# Patient Record
Sex: FEMALE | Race: WHITE | NOT HISPANIC OR LATINO | URBAN - METROPOLITAN AREA
[De-identification: names, ages, dates, MRNs, and addresses within clinical notes are randomized per-mention and may not be internally consistent; named-entity substitution may affect disease eponyms.]

---

## 2017-02-02 ENCOUNTER — FOLLOW UP ESTABLISHED (OUTPATIENT)
Dept: URBAN - METROPOLITAN AREA CLINIC 24 | Facility: CLINIC | Age: 82
End: 2017-02-02
Payer: MEDICARE

## 2017-02-02 PROCEDURE — 92134 CPTRZ OPH DX IMG PST SGM RTA: CPT | Performed by: OPHTHALMOLOGY

## 2017-02-02 PROCEDURE — 92014 COMPRE OPH EXAM EST PT 1/>: CPT | Performed by: OPHTHALMOLOGY

## 2017-02-02 PROCEDURE — 92235 FLUORESCEIN ANGRPH MLTIFRAME: CPT | Performed by: OPHTHALMOLOGY

## 2017-02-02 PROCEDURE — 67028 INJECTION EYE DRUG: CPT | Performed by: OPHTHALMOLOGY

## 2017-02-02 ASSESSMENT — INTRAOCULAR PRESSURE
OS: 17
OD: 16

## 2017-04-06 ENCOUNTER — FOLLOW UP ESTABLISHED (OUTPATIENT)
Dept: URBAN - METROPOLITAN AREA CLINIC 24 | Facility: CLINIC | Age: 82
End: 2017-04-06
Payer: MEDICARE

## 2017-04-06 PROCEDURE — 92014 COMPRE OPH EXAM EST PT 1/>: CPT | Performed by: OPHTHALMOLOGY

## 2017-04-06 PROCEDURE — 92134 CPTRZ OPH DX IMG PST SGM RTA: CPT | Performed by: OPHTHALMOLOGY

## 2017-04-06 PROCEDURE — 67028 INJECTION EYE DRUG: CPT | Performed by: OPHTHALMOLOGY

## 2017-04-06 ASSESSMENT — INTRAOCULAR PRESSURE
OD: 18
OS: 18

## 2017-07-06 ENCOUNTER — FOLLOW UP ESTABLISHED (OUTPATIENT)
Dept: URBAN - METROPOLITAN AREA CLINIC 24 | Facility: CLINIC | Age: 82
End: 2017-07-06
Payer: MEDICARE

## 2017-07-06 PROCEDURE — 67028 INJECTION EYE DRUG: CPT | Performed by: OPHTHALMOLOGY

## 2017-07-06 PROCEDURE — 92134 CPTRZ OPH DX IMG PST SGM RTA: CPT | Performed by: OPHTHALMOLOGY

## 2017-07-06 PROCEDURE — 92014 COMPRE OPH EXAM EST PT 1/>: CPT | Performed by: OPHTHALMOLOGY

## 2017-07-06 ASSESSMENT — INTRAOCULAR PRESSURE
OD: 16
OS: 17

## 2017-10-19 ENCOUNTER — FOLLOW UP ESTABLISHED (OUTPATIENT)
Dept: URBAN - METROPOLITAN AREA CLINIC 24 | Facility: CLINIC | Age: 82
End: 2017-10-19
Payer: MEDICARE

## 2017-10-19 DIAGNOSIS — H43.813 VITREOUS DEGENERATION, BILATERAL: ICD-10-CM

## 2017-10-19 PROCEDURE — 92134 CPTRZ OPH DX IMG PST SGM RTA: CPT | Performed by: OPTOMETRIST

## 2017-10-19 PROCEDURE — 99214 OFFICE O/P EST MOD 30 MIN: CPT | Performed by: OPTOMETRIST

## 2017-10-19 ASSESSMENT — INTRAOCULAR PRESSURE
OS: 22
OD: 22

## 2017-11-09 ENCOUNTER — FOLLOW UP ESTABLISHED (OUTPATIENT)
Dept: URBAN - METROPOLITAN AREA CLINIC 24 | Facility: CLINIC | Age: 82
End: 2017-11-09
Payer: MEDICARE

## 2017-11-09 DIAGNOSIS — H25.13 AGE-RELATED NUCLEAR CATARACT, BILATERAL: ICD-10-CM

## 2017-11-09 PROCEDURE — 92014 COMPRE OPH EXAM EST PT 1/>: CPT | Performed by: OPHTHALMOLOGY

## 2017-11-09 PROCEDURE — 92134 CPTRZ OPH DX IMG PST SGM RTA: CPT | Performed by: OPHTHALMOLOGY

## 2017-11-09 ASSESSMENT — INTRAOCULAR PRESSURE
OS: 15
OD: 15

## 2018-04-26 ENCOUNTER — FOLLOW UP ESTABLISHED (OUTPATIENT)
Dept: URBAN - METROPOLITAN AREA CLINIC 24 | Facility: CLINIC | Age: 83
End: 2018-04-26
Payer: MEDICARE

## 2018-04-26 PROCEDURE — 92134 CPTRZ OPH DX IMG PST SGM RTA: CPT | Performed by: OPHTHALMOLOGY

## 2018-04-26 PROCEDURE — 92014 COMPRE OPH EXAM EST PT 1/>: CPT | Performed by: OPHTHALMOLOGY

## 2018-04-26 PROCEDURE — 67028 INJECTION EYE DRUG: CPT | Performed by: OPHTHALMOLOGY

## 2018-04-26 ASSESSMENT — INTRAOCULAR PRESSURE
OD: 19
OS: 14

## 2018-06-05 ENCOUNTER — FOLLOW UP ESTABLISHED (OUTPATIENT)
Dept: URBAN - METROPOLITAN AREA CLINIC 24 | Facility: CLINIC | Age: 83
End: 2018-06-05
Payer: MEDICARE

## 2018-06-05 PROCEDURE — 67028 INJECTION EYE DRUG: CPT | Performed by: OPHTHALMOLOGY

## 2018-06-05 ASSESSMENT — INTRAOCULAR PRESSURE
OS: 13
OD: 19

## 2018-07-05 ENCOUNTER — FOLLOW UP ESTABLISHED (OUTPATIENT)
Dept: URBAN - METROPOLITAN AREA CLINIC 24 | Facility: CLINIC | Age: 83
End: 2018-07-05
Payer: MEDICARE

## 2018-07-05 PROCEDURE — 67028 INJECTION EYE DRUG: CPT | Performed by: OPHTHALMOLOGY

## 2018-07-05 ASSESSMENT — INTRAOCULAR PRESSURE
OS: 17
OD: 13

## 2018-08-23 ENCOUNTER — FOLLOW UP ESTABLISHED (OUTPATIENT)
Dept: URBAN - METROPOLITAN AREA CLINIC 24 | Facility: CLINIC | Age: 83
End: 2018-08-23
Payer: MEDICARE

## 2018-08-23 PROCEDURE — 67028 INJECTION EYE DRUG: CPT | Performed by: OPHTHALMOLOGY

## 2018-08-23 PROCEDURE — 92134 CPTRZ OPH DX IMG PST SGM RTA: CPT | Performed by: OPHTHALMOLOGY

## 2018-08-23 PROCEDURE — 92014 COMPRE OPH EXAM EST PT 1/>: CPT | Performed by: OPHTHALMOLOGY

## 2018-08-23 ASSESSMENT — INTRAOCULAR PRESSURE
OD: 16
OS: 15

## 2018-10-04 ENCOUNTER — FOLLOW UP ESTABLISHED (OUTPATIENT)
Dept: URBAN - METROPOLITAN AREA CLINIC 24 | Facility: CLINIC | Age: 83
End: 2018-10-04
Payer: MEDICARE

## 2018-10-04 PROCEDURE — 67028 INJECTION EYE DRUG: CPT | Performed by: OPHTHALMOLOGY

## 2018-10-04 ASSESSMENT — INTRAOCULAR PRESSURE
OD: 14
OS: 12

## 2018-11-15 ENCOUNTER — FOLLOW UP ESTABLISHED (OUTPATIENT)
Dept: URBAN - METROPOLITAN AREA CLINIC 24 | Facility: CLINIC | Age: 83
End: 2018-11-15
Payer: MEDICARE

## 2018-11-15 PROCEDURE — 67028 INJECTION EYE DRUG: CPT | Performed by: OPHTHALMOLOGY

## 2018-11-15 ASSESSMENT — INTRAOCULAR PRESSURE
OD: 18
OS: 15

## 2018-12-11 ENCOUNTER — FOLLOW UP ESTABLISHED (OUTPATIENT)
Dept: URBAN - METROPOLITAN AREA CLINIC 24 | Facility: CLINIC | Age: 83
End: 2018-12-11
Payer: MEDICARE

## 2018-12-11 PROCEDURE — 67028 INJECTION EYE DRUG: CPT | Performed by: OPHTHALMOLOGY

## 2018-12-11 PROCEDURE — 92134 CPTRZ OPH DX IMG PST SGM RTA: CPT | Performed by: OPHTHALMOLOGY

## 2018-12-11 ASSESSMENT — INTRAOCULAR PRESSURE
OS: 14
OD: 15

## 2019-01-17 ENCOUNTER — FOLLOW UP ESTABLISHED (OUTPATIENT)
Dept: URBAN - METROPOLITAN AREA CLINIC 24 | Facility: CLINIC | Age: 84
End: 2019-01-17
Payer: MEDICARE

## 2019-01-17 PROCEDURE — 67028 INJECTION EYE DRUG: CPT | Performed by: OPHTHALMOLOGY

## 2019-01-17 ASSESSMENT — INTRAOCULAR PRESSURE
OS: 17
OD: 19

## 2019-02-14 ENCOUNTER — FOLLOW UP ESTABLISHED (OUTPATIENT)
Dept: URBAN - METROPOLITAN AREA CLINIC 24 | Facility: CLINIC | Age: 84
End: 2019-02-14
Payer: MEDICARE

## 2019-02-14 PROCEDURE — 67028 INJECTION EYE DRUG: CPT | Performed by: OPHTHALMOLOGY

## 2019-02-14 ASSESSMENT — INTRAOCULAR PRESSURE
OD: 24
OS: 27

## 2019-03-14 ENCOUNTER — FOLLOW UP ESTABLISHED (OUTPATIENT)
Dept: URBAN - METROPOLITAN AREA CLINIC 24 | Facility: CLINIC | Age: 84
End: 2019-03-14
Payer: MEDICARE

## 2019-03-14 PROCEDURE — 67028 INJECTION EYE DRUG: CPT | Performed by: OPHTHALMOLOGY

## 2019-03-14 PROCEDURE — 92014 COMPRE OPH EXAM EST PT 1/>: CPT | Performed by: OPHTHALMOLOGY

## 2019-03-14 PROCEDURE — 92134 CPTRZ OPH DX IMG PST SGM RTA: CPT | Performed by: OPHTHALMOLOGY

## 2019-03-14 ASSESSMENT — INTRAOCULAR PRESSURE
OS: 18
OD: 15

## 2019-04-11 ENCOUNTER — FOLLOW UP ESTABLISHED (OUTPATIENT)
Dept: URBAN - METROPOLITAN AREA CLINIC 24 | Facility: CLINIC | Age: 84
End: 2019-04-11
Payer: MEDICARE

## 2019-04-11 PROCEDURE — 67028 INJECTION EYE DRUG: CPT | Performed by: OPHTHALMOLOGY

## 2019-04-11 ASSESSMENT — INTRAOCULAR PRESSURE
OD: 16
OS: 14

## 2019-05-09 ENCOUNTER — FOLLOW UP ESTABLISHED (OUTPATIENT)
Dept: URBAN - METROPOLITAN AREA CLINIC 24 | Facility: CLINIC | Age: 84
End: 2019-05-09
Payer: MEDICARE

## 2019-05-09 PROCEDURE — 67028 INJECTION EYE DRUG: CPT | Performed by: OPHTHALMOLOGY

## 2019-05-09 ASSESSMENT — INTRAOCULAR PRESSURE
OS: 22
OD: 22

## 2019-09-26 ENCOUNTER — FOLLOW UP ESTABLISHED (OUTPATIENT)
Dept: URBAN - METROPOLITAN AREA CLINIC 24 | Facility: CLINIC | Age: 84
End: 2019-09-26
Payer: MEDICARE

## 2019-09-26 PROCEDURE — 67028 INJECTION EYE DRUG: CPT | Performed by: OPHTHALMOLOGY

## 2019-09-26 ASSESSMENT — INTRAOCULAR PRESSURE
OS: 19
OD: 18

## 2019-11-21 ENCOUNTER — FOLLOW UP ESTABLISHED (OUTPATIENT)
Dept: URBAN - METROPOLITAN AREA CLINIC 24 | Facility: CLINIC | Age: 84
End: 2019-11-21
Payer: MEDICARE

## 2019-11-21 PROCEDURE — 92014 COMPRE OPH EXAM EST PT 1/>: CPT | Performed by: OPHTHALMOLOGY

## 2019-11-21 PROCEDURE — 92134 CPTRZ OPH DX IMG PST SGM RTA: CPT | Performed by: OPHTHALMOLOGY

## 2019-11-21 PROCEDURE — 67028 INJECTION EYE DRUG: CPT | Performed by: OPHTHALMOLOGY

## 2019-11-21 ASSESSMENT — INTRAOCULAR PRESSURE
OD: 18
OS: 17

## 2020-01-16 ENCOUNTER — FOLLOW UP ESTABLISHED (OUTPATIENT)
Dept: URBAN - METROPOLITAN AREA CLINIC 24 | Facility: CLINIC | Age: 85
End: 2020-01-16
Payer: MEDICARE

## 2020-01-16 PROCEDURE — 67028 INJECTION EYE DRUG: CPT | Performed by: OPHTHALMOLOGY

## 2020-01-16 ASSESSMENT — INTRAOCULAR PRESSURE
OD: 12
OS: 12

## 2020-03-05 ENCOUNTER — FOLLOW UP ESTABLISHED (OUTPATIENT)
Dept: URBAN - METROPOLITAN AREA CLINIC 24 | Facility: CLINIC | Age: 85
End: 2020-03-05
Payer: MEDICARE

## 2020-03-05 PROCEDURE — 67028 INJECTION EYE DRUG: CPT | Performed by: OPHTHALMOLOGY

## 2020-03-05 ASSESSMENT — INTRAOCULAR PRESSURE
OS: 20
OD: 26

## 2020-04-09 ENCOUNTER — FOLLOW UP ESTABLISHED (OUTPATIENT)
Dept: URBAN - METROPOLITAN AREA CLINIC 24 | Facility: CLINIC | Age: 85
End: 2020-04-09
Payer: MEDICARE

## 2020-04-09 PROCEDURE — 67028 INJECTION EYE DRUG: CPT | Performed by: OPHTHALMOLOGY

## 2020-04-09 ASSESSMENT — INTRAOCULAR PRESSURE
OD: 17
OS: 15

## 2020-06-05 ENCOUNTER — FOLLOW UP ESTABLISHED (OUTPATIENT)
Dept: URBAN - METROPOLITAN AREA CLINIC 10 | Facility: CLINIC | Age: 85
End: 2020-06-05
Payer: MEDICARE

## 2020-06-05 PROCEDURE — 92134 CPTRZ OPH DX IMG PST SGM RTA: CPT | Performed by: OPHTHALMOLOGY

## 2020-06-05 PROCEDURE — 92014 COMPRE OPH EXAM EST PT 1/>: CPT | Performed by: OPHTHALMOLOGY

## 2020-06-05 PROCEDURE — 67028 INJECTION EYE DRUG: CPT | Performed by: OPHTHALMOLOGY

## 2020-06-05 ASSESSMENT — INTRAOCULAR PRESSURE
OD: 24
OS: 20

## 2020-08-20 ENCOUNTER — FOLLOW UP ESTABLISHED (OUTPATIENT)
Dept: URBAN - METROPOLITAN AREA CLINIC 24 | Facility: CLINIC | Age: 85
End: 2020-08-20
Payer: MEDICARE

## 2020-08-20 PROCEDURE — 67028 INJECTION EYE DRUG: CPT | Performed by: OPHTHALMOLOGY

## 2020-08-20 ASSESSMENT — INTRAOCULAR PRESSURE
OD: 20
OS: 15

## 2020-10-15 ENCOUNTER — FOLLOW UP ESTABLISHED (OUTPATIENT)
Dept: URBAN - METROPOLITAN AREA CLINIC 24 | Facility: CLINIC | Age: 85
End: 2020-10-15
Payer: MEDICARE

## 2020-10-15 PROCEDURE — 67028 INJECTION EYE DRUG: CPT | Performed by: OPHTHALMOLOGY

## 2020-10-15 ASSESSMENT — INTRAOCULAR PRESSURE
OD: 16
OS: 17

## 2020-12-17 ENCOUNTER — FOLLOW UP ESTABLISHED (OUTPATIENT)
Dept: URBAN - METROPOLITAN AREA CLINIC 24 | Facility: CLINIC | Age: 85
End: 2020-12-17
Payer: MEDICARE

## 2020-12-17 PROCEDURE — 92014 COMPRE OPH EXAM EST PT 1/>: CPT | Performed by: OPHTHALMOLOGY

## 2020-12-17 PROCEDURE — 92235 FLUORESCEIN ANGRPH MLTIFRAME: CPT | Performed by: OPHTHALMOLOGY

## 2020-12-17 PROCEDURE — 92134 CPTRZ OPH DX IMG PST SGM RTA: CPT | Performed by: OPHTHALMOLOGY

## 2020-12-17 PROCEDURE — 67028 INJECTION EYE DRUG: CPT | Performed by: OPHTHALMOLOGY

## 2020-12-17 ASSESSMENT — INTRAOCULAR PRESSURE
OD: 17
OS: 19

## 2021-02-11 ENCOUNTER — FOLLOW UP ESTABLISHED (OUTPATIENT)
Dept: URBAN - METROPOLITAN AREA CLINIC 24 | Facility: CLINIC | Age: 86
End: 2021-02-11
Payer: MEDICARE

## 2021-02-11 PROCEDURE — 67028 INJECTION EYE DRUG: CPT | Performed by: OPHTHALMOLOGY

## 2021-02-11 ASSESSMENT — INTRAOCULAR PRESSURE
OD: 21
OS: 18

## 2021-04-01 ENCOUNTER — OFFICE VISIT (OUTPATIENT)
Dept: URBAN - METROPOLITAN AREA CLINIC 24 | Facility: CLINIC | Age: 86
End: 2021-04-01
Payer: MEDICARE

## 2021-04-01 PROCEDURE — 67028 INJECTION EYE DRUG: CPT | Performed by: OPHTHALMOLOGY

## 2021-04-01 ASSESSMENT — INTRAOCULAR PRESSURE
OD: 26
OS: 21

## 2021-04-01 NOTE — IMPRESSION/PLAN
Impression: Exdtve age-rel mclr degn, left eye, with actv chrdl neovas: H35.3221. Plan: An intravitreal Beovu injection was administered without complication OS. Pt to rtrn in 8-9 weeks for a repeat  injection OS.

## 2021-05-27 ENCOUNTER — OFFICE VISIT (OUTPATIENT)
Dept: URBAN - METROPOLITAN AREA CLINIC 24 | Facility: CLINIC | Age: 86
End: 2021-05-27
Payer: MEDICARE

## 2021-05-27 PROCEDURE — 67028 INJECTION EYE DRUG: CPT | Performed by: OPHTHALMOLOGY

## 2021-05-27 ASSESSMENT — INTRAOCULAR PRESSURE
OS: 15
OD: 16

## 2021-05-27 NOTE — IMPRESSION/PLAN
Impression: Exdtve age-rel mclr degn, left eye, with actv chrdl neovas: H35.3221. Plan: An intravitreal Beovu injection was administered without complication OS.  Pt to rtrn in 8-9 weeks for a dilated follow up and oct

## 2021-07-22 ENCOUNTER — OFFICE VISIT (OUTPATIENT)
Dept: URBAN - METROPOLITAN AREA CLINIC 24 | Facility: CLINIC | Age: 86
End: 2021-07-22
Payer: MEDICARE

## 2021-07-22 DIAGNOSIS — H35.3210 EXUDATIVE AGE-RELATED MACULAR DEGENERATION, RIGHT EYE, STAGE UNSPECIFIED: ICD-10-CM

## 2021-07-22 PROCEDURE — 92134 CPTRZ OPH DX IMG PST SGM RTA: CPT | Performed by: OPHTHALMOLOGY

## 2021-07-22 PROCEDURE — 92014 COMPRE OPH EXAM EST PT 1/>: CPT | Performed by: OPHTHALMOLOGY

## 2021-07-22 PROCEDURE — 92235 FLUORESCEIN ANGRPH MLTIFRAME: CPT | Performed by: OPHTHALMOLOGY

## 2021-07-22 PROCEDURE — 67028 INJECTION EYE DRUG: CPT | Performed by: OPHTHALMOLOGY

## 2021-07-22 ASSESSMENT — INTRAOCULAR PRESSURE
OD: 19
OS: 17

## 2021-07-22 NOTE — IMPRESSION/PLAN
Impression: Exudative age-related macular degeneration, right eye, stage unspecified: H35.3210.  Plan:

## 2021-07-22 NOTE — IMPRESSION/PLAN
Impression: Exudative macular degeneration, with active choroidal neovascularization, left eye Plan: The exam and OCT today show improvement but the FA shows continued leakage OS. The findings were discussed with the patient and we will continue Beovu injections on s q2 month injection schedule. An intravitreal Beovu injection was administered without complication OS. Pt to rtrn in 8 weeks for a repeat  injection.

## 2021-09-24 ENCOUNTER — OFFICE VISIT (OUTPATIENT)
Dept: URBAN - METROPOLITAN AREA CLINIC 10 | Facility: CLINIC | Age: 86
End: 2021-09-24
Payer: MEDICARE

## 2021-09-24 PROCEDURE — 67028 INJECTION EYE DRUG: CPT | Performed by: OPHTHALMOLOGY

## 2021-09-24 ASSESSMENT — INTRAOCULAR PRESSURE
OS: 14
OD: 14

## 2021-09-24 NOTE — IMPRESSION/PLAN
Impression: Exdtve age-rel mclr degn, left eye, with actv chrdl neovas: H35.3221. Plan: An intravitreal Beovu injection was administered without complication OS.  Patient will return to clinic in 2 months with Dr. Kristin Andrade

## 2021-11-15 ENCOUNTER — OFFICE VISIT (OUTPATIENT)
Dept: URBAN - METROPOLITAN AREA CLINIC 30 | Facility: CLINIC | Age: 86
End: 2021-11-15
Payer: MEDICARE

## 2021-11-15 DIAGNOSIS — H35.3221 EXDTVE AGE-REL MCLR DEGN, LEFT EYE, WITH ACTV CHRDL NEOVAS: Primary | ICD-10-CM

## 2021-11-15 DIAGNOSIS — H35.3213 EXUDATIVE AGE-REL MCLR DEGN, RIGHT EYE, WITH INACTIVE SCAR: ICD-10-CM

## 2021-11-15 PROCEDURE — 92134 CPTRZ OPH DX IMG PST SGM RTA: CPT | Performed by: OPHTHALMOLOGY

## 2021-11-15 PROCEDURE — 99214 OFFICE O/P EST MOD 30 MIN: CPT | Performed by: OPHTHALMOLOGY

## 2021-11-15 PROCEDURE — 67028 INJECTION EYE DRUG: CPT | Performed by: OPHTHALMOLOGY

## 2021-11-15 ASSESSMENT — INTRAOCULAR PRESSURE
OS: 15
OD: 20

## 2021-11-15 NOTE — IMPRESSION/PLAN
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 Plan: RIGHT eye --  SCAR -- NO HMG recur.   No action OD

## 2021-11-15 NOTE — IMPRESSION/PLAN
Impression: LEFT eye CNVM -h/o MERLIN Cont BEOVU extend OS Plan: Prior injx MERLIN STUDY OS injx. THEN out of study EXTENDED inj BEOVU OS [facility] - now txfr for [facility] for location convenience. TODAY BEOVU OS (proc note). RTC 11-12w ND/inj/OCT plan Beovu OS     Future Exam? 
An intravitreal Beovu injection was administered without complication OS.  Patient will return to clinic in 2 months with Dr. Elmer Rubin

## 2022-02-07 ENCOUNTER — OFFICE VISIT (OUTPATIENT)
Dept: URBAN - METROPOLITAN AREA CLINIC 30 | Facility: CLINIC | Age: 87
End: 2022-02-07
Payer: MEDICARE

## 2022-02-07 PROCEDURE — 92134 CPTRZ OPH DX IMG PST SGM RTA: CPT | Performed by: OPHTHALMOLOGY

## 2022-02-07 PROCEDURE — 67028 INJECTION EYE DRUG: CPT | Performed by: OPHTHALMOLOGY

## 2022-02-07 ASSESSMENT — INTRAOCULAR PRESSURE
OD: 23
OS: 17

## 2022-02-07 NOTE — IMPRESSION/PLAN
Impression: LEFT eye CNVM -h/o MERLIN Cont BEOVU extend OS Plan: Prior injx MERLIN STUDY OS injx. THEN out of study EXTENDED inj BEOVU OS [facility] - now txfr for [facility] for location convenience. TODAY BEOVU OS (proc note). RTC 11-12w dil, OCT, eval - h/o Beovu OS     Future HRA?

## 2022-05-12 ENCOUNTER — OFFICE VISIT (OUTPATIENT)
Dept: URBAN - METROPOLITAN AREA CLINIC 24 | Facility: CLINIC | Age: 87
End: 2022-05-12
Payer: MEDICARE

## 2022-05-12 DIAGNOSIS — H35.3221 EXUDATIVE AGE-RELATED MACULAR DEGENERATION, LEFT EYE, WITH ACTIVE CHOROIDAL NEOVASCULARIZATION: Primary | ICD-10-CM

## 2022-05-12 DIAGNOSIS — H35.3213 EXUDATIVE AGE-REL MCLR DEGN, RIGHT EYE, WITH INACTIVE SCAR: ICD-10-CM

## 2022-05-12 PROCEDURE — 92134 CPTRZ OPH DX IMG PST SGM RTA: CPT | Performed by: OPHTHALMOLOGY

## 2022-05-12 PROCEDURE — 99214 OFFICE O/P EST MOD 30 MIN: CPT | Performed by: OPHTHALMOLOGY

## 2022-05-12 PROCEDURE — 67028 INJECTION EYE DRUG: CPT | Performed by: OPHTHALMOLOGY

## 2022-05-12 ASSESSMENT — INTRAOCULAR PRESSURE
OD: 16
OS: 17

## 2022-05-12 NOTE — IMPRESSION/PLAN
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 Plan:  On-label FDA-approved drug, yet low (not zero) risk of adverse ocular event --Understood -accepted. Specific BeoVu I.O.I. is On-label FDA-approved drug, yet low (not zero) risk of adverse ocular event --Understood -accepted. Specific BeoVu I.O.I. issues / risks d/w'd pt in detail. Risk I.O.I. est. 4-5% & occlus. vasculitis may be ~1%  Pt must call & get in immediately ifues / risks d/w'd pt in detail. Risk I.O.I. est. 4-5% & occlus.  vasculitis may be ~1%  Pt must call & get in immediately if symptoms

## 2022-05-12 NOTE — IMPRESSION/PLAN
Impression: RIGHT eye Inactive scar: H35.6991. Plan: TODAY repeated exam -- RIGHT eye --  SCAR -- NO HMG recur.   No action OD

## 2022-05-12 NOTE — IMPRESSION/PLAN
Impression: LEFT eye CNVM -h/o MERLIN Cont BEOVU extend OS Plan: hx: [[Prior inj MERLIN STUDY OS injx. THEN out of study EXTENDED inj BEOVU OS [place] - now txfr for [place] [facility] for location convenience. Future Vabysmo]] TODAY BEOVU OS (proc note). Future ND?  
   RTC 11-12w dil pos HRA / plan VABYSMO OS (issues w Beovu support now)

## 2022-07-25 ENCOUNTER — OFFICE VISIT (OUTPATIENT)
Dept: URBAN - METROPOLITAN AREA CLINIC 30 | Facility: CLINIC | Age: 87
End: 2022-07-25
Payer: MEDICARE

## 2022-07-25 DIAGNOSIS — H35.3213 EXUDATIVE AGE-REL MCLR DEGN, RIGHT EYE, WITH INACTIVE SCAR: ICD-10-CM

## 2022-07-25 DIAGNOSIS — H35.3221 EXUDATIVE AGE-RELATED MACULAR DEGENERATION, LEFT EYE, WITH ACTIVE CHOROIDAL NEOVASCULARIZATION: Primary | ICD-10-CM

## 2022-07-25 PROCEDURE — 92242 FLUORESCEIN&ICG ANGIOGRAPHY: CPT | Performed by: OPHTHALMOLOGY

## 2022-07-25 PROCEDURE — 92250 FUNDUS PHOTOGRAPHY W/I&R: CPT | Performed by: OPHTHALMOLOGY

## 2022-07-25 PROCEDURE — 92134 CPTRZ OPH DX IMG PST SGM RTA: CPT | Performed by: OPHTHALMOLOGY

## 2022-07-25 PROCEDURE — 67028 INJECTION EYE DRUG: CPT | Performed by: OPHTHALMOLOGY

## 2022-07-25 ASSESSMENT — INTRAOCULAR PRESSURE
OD: 25
OS: 17

## 2022-07-25 NOTE — IMPRESSION/PLAN
Impression: LEFT eye CNVM -h/o MERLIN Cont BEOVU extend OS Plan: hx: [[Prior inj MERLIN STUDY OS injx. THEN out of study EXTENDED inj BEOVU OS [place] - now txfr for [facility] for location convenience. Future Vabysmo]] TODAY VABYSMO  OS (proc note).    Future exam ?  
   RTC 11-12w ND/inj/ OCT plan VABYSMO OS (issues w Beovu support now)

## 2022-07-25 NOTE — IMPRESSION/PLAN
Impression: RIGHT eye Inactive scar: H35.7525. Plan: TODAY repeated FA    RIGHT eye --  SCAR -- NO HMG recur.   No action OD

## 2022-10-17 ENCOUNTER — OFFICE VISIT (OUTPATIENT)
Dept: URBAN - METROPOLITAN AREA CLINIC 30 | Facility: CLINIC | Age: 87
End: 2022-10-17
Payer: MEDICARE

## 2022-10-17 DIAGNOSIS — H35.3221 EXDTVE AGE-REL MCLR DEGN, LEFT EYE, WITH ACTV CHRDL NEOVAS: Primary | ICD-10-CM

## 2022-10-17 DIAGNOSIS — H35.3213 EXUDATIVE AGE-REL MCLR DEGN, RIGHT EYE, WITH INACTIVE SCAR: ICD-10-CM

## 2022-10-17 PROCEDURE — 92134 CPTRZ OPH DX IMG PST SGM RTA: CPT | Performed by: OPHTHALMOLOGY

## 2022-10-17 PROCEDURE — 67028 INJECTION EYE DRUG: CPT | Performed by: OPHTHALMOLOGY

## 2022-10-17 ASSESSMENT — INTRAOCULAR PRESSURE
OS: 14
OD: 21

## 2022-10-17 NOTE — IMPRESSION/PLAN
Impression: LEFT eye CNVM -h/o MERLIN Cont BEOVU extend OS Plan: hx: [[Prior inj MERLIN STUDY OS injx. THEN out of study EXTENDED inj BEOVU OS [place] - now txfr for [facility] for location convenience. Future Vabysmo]] TODAY VABYSMO  OS (proc note). Future HRA?    
   RTC 11-12w dil, OCT, eval - h/o  VABYSMO OS (issues w Beovu support now)

## 2023-01-09 ENCOUNTER — OFFICE VISIT (OUTPATIENT)
Dept: URBAN - METROPOLITAN AREA CLINIC 30 | Facility: CLINIC | Age: 88
End: 2023-01-09
Payer: MEDICARE

## 2023-01-09 DIAGNOSIS — H35.3213 EXUDATIVE AGE-REL MCLR DEGN, RIGHT EYE, WITH INACTIVE SCAR: ICD-10-CM

## 2023-01-09 DIAGNOSIS — H35.3221 EXUDATIVE AGE-RELATED MACULAR DEGENERATION, LEFT EYE, WITH ACTIVE CHOROIDAL NEOVASCULARIZATION: Primary | ICD-10-CM

## 2023-01-09 PROCEDURE — 99214 OFFICE O/P EST MOD 30 MIN: CPT | Performed by: OPHTHALMOLOGY

## 2023-01-09 PROCEDURE — 67028 INJECTION EYE DRUG: CPT | Performed by: OPHTHALMOLOGY

## 2023-01-09 PROCEDURE — 92134 CPTRZ OPH DX IMG PST SGM RTA: CPT | Performed by: OPHTHALMOLOGY

## 2023-01-09 ASSESSMENT — INTRAOCULAR PRESSURE
OD: 26
OS: 23

## 2023-01-09 NOTE — IMPRESSION/PLAN
Impression: RIGHT eye Inactive scar: H35.6054. Plan: TODAY EXAM of RIGHT eye --  SCAR -- NO HMG recur.   No action OD -- observe

## 2023-01-09 NOTE — IMPRESSION/PLAN
Impression: LEFT eye CNVM -h/o MERLIN Cont BEOVU extend OS Plan: hx: [[Prior inj MERLIN STUDY OS injx. THEN out of study EXTENDED inj BEOVU OS [place] - now txfr for [place] [facility] for location convenience. Future Vabysmo]] TODAY VABYSMO  OS (proc note). Future ND?     
   RTC 11-12w Plan HRA / plan VABYSMO OS (issues w Beovu support now)

## 2023-04-03 ENCOUNTER — OFFICE VISIT (OUTPATIENT)
Dept: URBAN - METROPOLITAN AREA CLINIC 30 | Facility: CLINIC | Age: 88
End: 2023-04-03
Payer: MEDICARE

## 2023-04-03 DIAGNOSIS — H35.3221 EXUDATIVE AGE-RELATED MACULAR DEGENERATION, LEFT EYE, WITH ACTIVE CHOROIDAL NEOVASCULARIZATION: Primary | ICD-10-CM

## 2023-04-03 DIAGNOSIS — H35.3213 EXUDATIVE AGE-REL MCLR DEGN, RIGHT EYE, WITH INACTIVE SCAR: ICD-10-CM

## 2023-04-03 PROCEDURE — 92250 FUNDUS PHOTOGRAPHY W/I&R: CPT | Performed by: OPHTHALMOLOGY

## 2023-04-03 PROCEDURE — 67028 INJECTION EYE DRUG: CPT | Performed by: OPHTHALMOLOGY

## 2023-04-03 PROCEDURE — 92134 CPTRZ OPH DX IMG PST SGM RTA: CPT | Performed by: OPHTHALMOLOGY

## 2023-04-03 PROCEDURE — 92242 FLUORESCEIN&ICG ANGIOGRAPHY: CPT | Performed by: OPHTHALMOLOGY

## 2023-04-03 ASSESSMENT — INTRAOCULAR PRESSURE
OD: 17
OS: 15

## 2023-04-03 NOTE — IMPRESSION/PLAN
Impression: LEFT eye CNVM -h/o MERLIN 
  BEOVU extend OS / Vabysmo Plan: hx: [[Prior inj MERLIN STUDY OS injx. THEN out of study EXTENDED inj BEOVU OS [facility] - now txfr for [facility] for location convenience. Future Vabysmo]] TODAY VABYSMO  OS (proc note).    Future exam?    
   RTC 11-12w  ND/inj/OCT  plan VABYSMO OS (issues w Beovu support now)

## 2023-04-03 NOTE — IMPRESSION/PLAN
Impression: RIGHT eye Inactive scar: H33.1489. Plan: TODAY FA RIGHT eye --  SCAR -- NO HMG recur.   No action OD -- observe

## 2023-08-21 ENCOUNTER — OFFICE VISIT (OUTPATIENT)
Dept: URBAN - METROPOLITAN AREA CLINIC 30 | Facility: CLINIC | Age: 88
End: 2023-08-21
Payer: MEDICARE

## 2023-08-21 DIAGNOSIS — H35.3221 EXDTVE AGE-REL MCLR DEGN, LEFT EYE, WITH ACTV CHRDL NEOVAS: ICD-10-CM

## 2023-08-21 DIAGNOSIS — H35.3213 EXUDATIVE AGE-RELATED MACULAR DEGENERATION, RIGHT EYE, WITH INACTIVE SCAR: Primary | ICD-10-CM

## 2023-08-21 PROCEDURE — 67028 INJECTION EYE DRUG: CPT | Performed by: OPHTHALMOLOGY

## 2023-08-21 PROCEDURE — 92134 CPTRZ OPH DX IMG PST SGM RTA: CPT | Performed by: OPHTHALMOLOGY

## 2023-08-21 ASSESSMENT — INTRAOCULAR PRESSURE
OD: 18
OS: 14

## 2023-11-13 ENCOUNTER — OFFICE VISIT (OUTPATIENT)
Dept: URBAN - METROPOLITAN AREA CLINIC 30 | Facility: CLINIC | Age: 88
End: 2023-11-13
Payer: MEDICARE

## 2023-11-13 DIAGNOSIS — H35.3213 EXUDATIVE AGE-REL MCLR DEGN, RIGHT EYE, WITH INACTIVE SCAR: ICD-10-CM

## 2023-11-13 PROCEDURE — 67028 INJECTION EYE DRUG: CPT | Performed by: OPHTHALMOLOGY

## 2023-11-13 PROCEDURE — 99214 OFFICE O/P EST MOD 30 MIN: CPT | Performed by: OPHTHALMOLOGY

## 2023-11-13 PROCEDURE — 92134 CPTRZ OPH DX IMG PST SGM RTA: CPT | Performed by: OPHTHALMOLOGY

## 2023-11-13 ASSESSMENT — INTRAOCULAR PRESSURE
OD: 20
OS: 19

## 2024-02-05 ENCOUNTER — OFFICE VISIT (OUTPATIENT)
Dept: URBAN - METROPOLITAN AREA CLINIC 30 | Facility: CLINIC | Age: 89
End: 2024-02-05
Payer: MEDICARE

## 2024-02-05 DIAGNOSIS — H35.3221 EXUDATIVE AGE-RELATED MACULAR DEGENERATION, LEFT EYE, WITH ACTIVE CHOROIDAL NEOVASCULARIZATION: Primary | ICD-10-CM

## 2024-02-05 PROCEDURE — 92250 FUNDUS PHOTOGRAPHY W/I&R: CPT | Performed by: OPHTHALMOLOGY

## 2024-02-05 PROCEDURE — 67028 INJECTION EYE DRUG: CPT | Performed by: OPHTHALMOLOGY

## 2024-02-05 PROCEDURE — 92242 FLUORESCEIN&ICG ANGIOGRAPHY: CPT | Performed by: OPHTHALMOLOGY

## 2024-02-05 PROCEDURE — 92134 CPTRZ OPH DX IMG PST SGM RTA: CPT | Performed by: OPHTHALMOLOGY

## 2024-02-05 ASSESSMENT — INTRAOCULAR PRESSURE
OS: 16
OD: 20

## 2024-04-29 ENCOUNTER — OFFICE VISIT (OUTPATIENT)
Dept: URBAN - METROPOLITAN AREA CLINIC 30 | Facility: CLINIC | Age: 89
End: 2024-04-29
Payer: MEDICARE

## 2024-04-29 DIAGNOSIS — H35.3221 EXUDATIVE AGE-RELATED MACULAR DEGENERATION, LEFT EYE, WITH ACTIVE CHOROIDAL NEOVASCULARIZATION: Primary | ICD-10-CM

## 2024-04-29 PROCEDURE — 67028 INJECTION EYE DRUG: CPT | Performed by: OPHTHALMOLOGY

## 2024-04-29 PROCEDURE — 92134 CPTRZ OPH DX IMG PST SGM RTA: CPT | Performed by: OPHTHALMOLOGY

## 2024-04-29 ASSESSMENT — INTRAOCULAR PRESSURE
OD: 17
OS: 14

## 2024-08-05 ENCOUNTER — OFFICE VISIT (OUTPATIENT)
Dept: URBAN - METROPOLITAN AREA CLINIC 30 | Facility: CLINIC | Age: 89
End: 2024-08-05
Payer: MEDICARE

## 2024-08-05 DIAGNOSIS — H35.3221 EXUDATIVE AGE-RELATED MACULAR DEGENERATION, LEFT EYE, WITH ACTIVE CHOROIDAL NEOVASCULARIZATION: Primary | ICD-10-CM

## 2024-08-05 DIAGNOSIS — H35.3213 EXUDATIVE AGE-REL MCLR DEGN, RIGHT EYE, WITH INACTIVE SCAR: ICD-10-CM

## 2024-08-05 PROCEDURE — 92134 CPTRZ OPH DX IMG PST SGM RTA: CPT | Performed by: OPHTHALMOLOGY

## 2024-08-05 PROCEDURE — 99214 OFFICE O/P EST MOD 30 MIN: CPT | Performed by: OPHTHALMOLOGY

## 2024-08-05 PROCEDURE — 67028 INJECTION EYE DRUG: CPT | Performed by: OPHTHALMOLOGY

## 2024-08-05 ASSESSMENT — INTRAOCULAR PRESSURE
OS: 9
OD: 14

## 2024-10-28 ENCOUNTER — OFFICE VISIT (OUTPATIENT)
Dept: URBAN - METROPOLITAN AREA CLINIC 30 | Facility: CLINIC | Age: 89
End: 2024-10-28
Payer: MEDICARE

## 2024-10-28 DIAGNOSIS — H35.3221 EXUDATIVE AGE-RELATED MACULAR DEGENERATION, LEFT EYE, WITH ACTIVE CHOROIDAL NEOVASCULARIZATION: Primary | ICD-10-CM

## 2024-10-28 PROCEDURE — 92242 FLUORESCEIN&ICG ANGIOGRAPHY: CPT | Performed by: OPHTHALMOLOGY

## 2024-10-28 PROCEDURE — 67028 INJECTION EYE DRUG: CPT | Performed by: OPHTHALMOLOGY

## 2024-10-28 PROCEDURE — 92134 CPTRZ OPH DX IMG PST SGM RTA: CPT | Performed by: OPHTHALMOLOGY

## 2024-10-28 ASSESSMENT — INTRAOCULAR PRESSURE
OS: 16
OD: 18

## 2025-02-03 ENCOUNTER — OFFICE VISIT (OUTPATIENT)
Dept: URBAN - METROPOLITAN AREA CLINIC 30 | Facility: CLINIC | Age: OVER 89
End: 2025-02-03
Payer: MEDICARE

## 2025-02-03 DIAGNOSIS — H35.3221 EXUDATIVE AGE-RELATED MACULAR DEGENERATION, LEFT EYE, WITH ACTIVE CHOROIDAL NEOVASCULARIZATION: Primary | ICD-10-CM

## 2025-02-03 PROCEDURE — 67028 INJECTION EYE DRUG: CPT | Performed by: OPHTHALMOLOGY

## 2025-02-03 PROCEDURE — 92134 CPTRZ OPH DX IMG PST SGM RTA: CPT | Performed by: OPHTHALMOLOGY

## 2025-02-03 ASSESSMENT — INTRAOCULAR PRESSURE
OS: 15
OD: 17

## 2025-05-12 ENCOUNTER — OFFICE VISIT (OUTPATIENT)
Dept: URBAN - METROPOLITAN AREA CLINIC 30 | Facility: CLINIC | Age: OVER 89
End: 2025-05-12
Payer: MEDICARE

## 2025-05-12 DIAGNOSIS — H35.3213 EXUDATIVE AGE-REL MCLR DEGN, RIGHT EYE, WITH INACTIVE SCAR: ICD-10-CM

## 2025-05-12 DIAGNOSIS — H35.3221 EXUDATIVE AGE-RELATED MACULAR DEGENERATION, LEFT EYE, WITH ACTIVE CHOROIDAL NEOVASCULARIZATION: Primary | ICD-10-CM

## 2025-05-12 PROCEDURE — 92134 CPTRZ OPH DX IMG PST SGM RTA: CPT | Performed by: OPHTHALMOLOGY

## 2025-05-12 PROCEDURE — 99214 OFFICE O/P EST MOD 30 MIN: CPT | Performed by: OPHTHALMOLOGY

## 2025-05-12 PROCEDURE — 67028 INJECTION EYE DRUG: CPT | Performed by: OPHTHALMOLOGY

## 2025-05-12 ASSESSMENT — INTRAOCULAR PRESSURE
OS: 17
OD: 20

## 2025-08-18 ENCOUNTER — OFFICE VISIT (OUTPATIENT)
Dept: URBAN - METROPOLITAN AREA CLINIC 30 | Facility: CLINIC | Age: OVER 89
End: 2025-08-18
Payer: MEDICARE

## 2025-08-18 DIAGNOSIS — H35.3221 EXUDATIVE AGE-RELATED MACULAR DEGENERATION, LEFT EYE, WITH ACTIVE CHOROIDAL NEOVASCULARIZATION: Primary | ICD-10-CM

## 2025-08-18 PROCEDURE — 67028 INJECTION EYE DRUG: CPT | Performed by: OPHTHALMOLOGY

## 2025-08-18 PROCEDURE — 92235 FLUORESCEIN ANGRPH MLTIFRAME: CPT | Performed by: OPHTHALMOLOGY

## 2025-08-18 PROCEDURE — 92134 CPTRZ OPH DX IMG PST SGM RTA: CPT | Performed by: OPHTHALMOLOGY

## 2025-08-18 ASSESSMENT — INTRAOCULAR PRESSURE
OD: 19
OS: 17